# Patient Record
Sex: FEMALE | URBAN - METROPOLITAN AREA
[De-identification: names, ages, dates, MRNs, and addresses within clinical notes are randomized per-mention and may not be internally consistent; named-entity substitution may affect disease eponyms.]

---

## 2024-02-28 PROCEDURE — 88312 SPECIAL STAINS GROUP 1: CPT

## 2024-02-28 PROCEDURE — 88312 SPECIAL STAINS GROUP 1: CPT | Performed by: DERMATOLOGY

## 2024-02-28 PROCEDURE — 88304 TISSUE EXAM BY PATHOLOGIST: CPT

## 2024-02-28 PROCEDURE — 88304 TISSUE EXAM BY PATHOLOGIST: CPT | Performed by: DERMATOLOGY

## 2024-03-04 ENCOUNTER — LAB REQUISITION (OUTPATIENT)
Dept: DERMATOPATHOLOGY | Facility: CLINIC | Age: 44
End: 2024-03-04
Payer: COMMERCIAL

## 2024-03-04 DIAGNOSIS — L98.9 DISORDER OF THE SKIN AND SUBCUTANEOUS TISSUE, UNSPECIFIED: ICD-10-CM

## 2024-03-05 LAB
LAB AP ASR DISCLAIMER: NORMAL
LABORATORY COMMENT REPORT: NORMAL
PATH REPORT.FINAL DX SPEC: NORMAL
PATH REPORT.GROSS SPEC: NORMAL
PATH REPORT.RELEVANT HX SPEC: NORMAL
PATH REPORT.TOTAL CANCER: NORMAL

## 2024-10-16 ENCOUNTER — APPOINTMENT (OUTPATIENT)
Dept: OBSTETRICS AND GYNECOLOGY | Facility: CLINIC | Age: 44
End: 2024-10-16
Payer: COMMERCIAL

## 2024-10-16 VITALS
HEIGHT: 69 IN | SYSTOLIC BLOOD PRESSURE: 122 MMHG | DIASTOLIC BLOOD PRESSURE: 86 MMHG | WEIGHT: 293 LBS | BODY MASS INDEX: 43.4 KG/M2

## 2024-10-16 DIAGNOSIS — Z01.419 WELL WOMAN EXAM WITH ROUTINE GYNECOLOGICAL EXAM: ICD-10-CM

## 2024-10-16 PROCEDURE — 88175 CYTOPATH C/V AUTO FLUID REDO: CPT

## 2024-10-16 PROCEDURE — 99396 PREV VISIT EST AGE 40-64: CPT | Performed by: OBSTETRICS & GYNECOLOGY

## 2024-10-16 PROCEDURE — 3008F BODY MASS INDEX DOCD: CPT | Performed by: OBSTETRICS & GYNECOLOGY

## 2024-10-16 PROCEDURE — 1036F TOBACCO NON-USER: CPT | Performed by: OBSTETRICS & GYNECOLOGY

## 2024-10-16 ASSESSMENT — PAIN SCALES - GENERAL: PAINLEVEL_OUTOF10: 0-NO PAIN

## 2024-10-16 NOTE — PROGRESS NOTES
"Subjective   Patient ID: Kristi Steward \"Tay" is a 44 y.o. female who presents for Well Women Visit (Annual exam with pap smear /- has order for mammogram//Refill birth control//C/o  trouble sleeping, weight gain, anxiety/- discuss perimenopause//Chaperone declined).  HPI  As contained in the chief complaint  Review of Systems  10 systems have been reviewed and are negative and noncontributory to the patient current ailments.    Objective   Physical Exam  Vital signs reviewed    CONSTITUTIONAL- well nourished, well developed, looks like stated age.  She is sitting comfortably on the exam table in no apparent distress  SKIN- normal skin color and pigmentation no visible lesions  EYES- normal external exam  THYROID- symmetrical ,normal size and normal consistency  HEART- RRR without murmur, S3 or S4.  LUNGS- breathing comfortably, no dyspnea  EXTREMITIES- no deformities.  Edema, discoloration, or pain in BLE  NEUROLOGICAL- A/Ox3, conversational   PSYCHIATRIC- alert, pleasant and cordial, age-appropriate, not anxious, or depressed appearing  BREASTS-normal appearance, no skin changes or nipple discharge.  Palpation of the breast and axillae: No palpable mass and no axillary lymphadenopathy  ABDOMEN- soft, non distended, bowel sound normal pitch and intensity,no palpable abnormal masses  GENITOURINARY- External genitalia: Normal.                                 - Uterus: AV/AF NSSC, mobile and nontender                                -The adnexal areas are free of tenderness or mass                                -There are no cervical lesions; there is no cervical motion tenderness                                -Vagina without lesions, mucosa pink and well-hydrated, discharge is physiologic.                                   Inspection of Perianal Area: Normal    Assessment/Plan   Diagnoses and all orders for this visit:  Well woman exam with routine gynecological exam  -Educated on the normal process of " perimenopause and menopause.  Given that she is on a combination birth control pill her symptoms are probably unrelated to perimenopause.  She will address her symptoms with her primary physician         Mo Gama DO 10/16/24 10:19 AM

## 2024-10-22 ENCOUNTER — HOSPITAL ENCOUNTER (OUTPATIENT)
Dept: RADIOLOGY | Facility: CLINIC | Age: 44
Discharge: HOME | End: 2024-10-22
Payer: COMMERCIAL

## 2024-10-22 VITALS — WEIGHT: 293 LBS | HEIGHT: 69 IN | BODY MASS INDEX: 43.4 KG/M2

## 2024-10-22 DIAGNOSIS — Z12.31 BREAST CANCER SCREENING BY MAMMOGRAM: ICD-10-CM

## 2024-10-22 PROCEDURE — 77067 SCR MAMMO BI INCL CAD: CPT

## 2024-10-28 LAB
CYTOLOGY CMNT CVX/VAG CYTO-IMP: NORMAL
LAB AP CONTRACEPTIVE HISTORY: NORMAL
LAB AP HPV GENOTYPE QUESTION: YES
LAB AP HPV HR: NORMAL
LABORATORY COMMENT REPORT: NORMAL
MENSTRUAL HX REPORTED: NORMAL
PATH REPORT.TOTAL CANCER: NORMAL

## 2024-12-09 ENCOUNTER — APPOINTMENT (OUTPATIENT)
Dept: PRIMARY CARE | Facility: CLINIC | Age: 44
End: 2024-12-09
Payer: COMMERCIAL

## 2024-12-09 VITALS
WEIGHT: 293 LBS | TEMPERATURE: 97.4 F | SYSTOLIC BLOOD PRESSURE: 130 MMHG | HEART RATE: 78 BPM | OXYGEN SATURATION: 96 % | BODY MASS INDEX: 43.4 KG/M2 | DIASTOLIC BLOOD PRESSURE: 74 MMHG | HEIGHT: 69 IN

## 2024-12-09 DIAGNOSIS — Z00.00 ROUTINE GENERAL MEDICAL EXAMINATION AT A HEALTH CARE FACILITY: Primary | ICD-10-CM

## 2024-12-09 DIAGNOSIS — M25.561 CHRONIC PAIN OF RIGHT KNEE: ICD-10-CM

## 2024-12-09 DIAGNOSIS — E66.01 CLASS 3 SEVERE OBESITY DUE TO EXCESS CALORIES WITHOUT SERIOUS COMORBIDITY WITH BODY MASS INDEX (BMI) OF 40.0 TO 44.9 IN ADULT: ICD-10-CM

## 2024-12-09 DIAGNOSIS — Z23 ENCOUNTER FOR IMMUNIZATION: ICD-10-CM

## 2024-12-09 DIAGNOSIS — E66.813 CLASS 3 SEVERE OBESITY DUE TO EXCESS CALORIES WITHOUT SERIOUS COMORBIDITY WITH BODY MASS INDEX (BMI) OF 40.0 TO 44.9 IN ADULT: ICD-10-CM

## 2024-12-09 DIAGNOSIS — G89.29 CHRONIC PAIN OF RIGHT KNEE: ICD-10-CM

## 2024-12-09 PROBLEM — G43.829 MENSTRUAL MIGRAINE: Status: ACTIVE | Noted: 2024-12-09

## 2024-12-09 LAB
ALBUMIN SERPL BCP-MCNC: 3.9 G/DL (ref 3.4–5)
ALP SERPL-CCNC: 43 U/L (ref 33–110)
ALT SERPL W P-5'-P-CCNC: 4 U/L (ref 7–45)
ANION GAP SERPL CALC-SCNC: 14 MMOL/L (ref 10–20)
AST SERPL W P-5'-P-CCNC: 11 U/L (ref 9–39)
BILIRUB SERPL-MCNC: 0.5 MG/DL (ref 0–1.2)
BUN SERPL-MCNC: 14 MG/DL (ref 6–23)
CALCIUM SERPL-MCNC: 8.9 MG/DL (ref 8.6–10.6)
CHLORIDE SERPL-SCNC: 106 MMOL/L (ref 98–107)
CHOLEST SERPL-MCNC: 213 MG/DL (ref 0–199)
CHOLESTEROL/HDL RATIO: 3.7
CO2 SERPL-SCNC: 24 MMOL/L (ref 21–32)
CREAT SERPL-MCNC: 0.91 MG/DL (ref 0.5–1.05)
EGFRCR SERPLBLD CKD-EPI 2021: 80 ML/MIN/1.73M*2
ERYTHROCYTE [DISTWIDTH] IN BLOOD BY AUTOMATED COUNT: 13.1 % (ref 11.5–14.5)
EST. AVERAGE GLUCOSE BLD GHB EST-MCNC: 100 MG/DL
GLUCOSE SERPL-MCNC: 93 MG/DL (ref 74–99)
HBA1C MFR BLD: 5.1 %
HCT VFR BLD AUTO: 44.1 % (ref 36–46)
HDLC SERPL-MCNC: 57.1 MG/DL
HGB BLD-MCNC: 14.2 G/DL (ref 12–16)
HIV 1+2 AB+HIV1 P24 AG SERPL QL IA: NONREACTIVE
LDLC SERPL CALC-MCNC: 128 MG/DL
MCH RBC QN AUTO: 28.6 PG (ref 26–34)
MCHC RBC AUTO-ENTMCNC: 32.2 G/DL (ref 32–36)
MCV RBC AUTO: 89 FL (ref 80–100)
NON HDL CHOLESTEROL: 156 MG/DL (ref 0–149)
NRBC BLD-RTO: 0 /100 WBCS (ref 0–0)
PLATELET # BLD AUTO: 406 X10*3/UL (ref 150–450)
POTASSIUM SERPL-SCNC: 4.1 MMOL/L (ref 3.5–5.3)
PROT SERPL-MCNC: 6.7 G/DL (ref 6.4–8.2)
RBC # BLD AUTO: 4.96 X10*6/UL (ref 4–5.2)
SODIUM SERPL-SCNC: 140 MMOL/L (ref 136–145)
TRIGL SERPL-MCNC: 138 MG/DL (ref 0–149)
TSH SERPL-ACNC: 2.13 MIU/L (ref 0.44–3.98)
VLDL: 28 MG/DL (ref 0–40)
WBC # BLD AUTO: 6 X10*3/UL (ref 4.4–11.3)

## 2024-12-09 PROCEDURE — 85027 COMPLETE CBC AUTOMATED: CPT

## 2024-12-09 PROCEDURE — 84443 ASSAY THYROID STIM HORMONE: CPT

## 2024-12-09 PROCEDURE — 87389 HIV-1 AG W/HIV-1&-2 AB AG IA: CPT

## 2024-12-09 PROCEDURE — 90480 ADMN SARSCOV2 VAC 1/ONLY CMP: CPT | Performed by: NURSE PRACTITIONER

## 2024-12-09 PROCEDURE — 91320 SARSCV2 VAC 30MCG TRS-SUC IM: CPT | Performed by: NURSE PRACTITIONER

## 2024-12-09 PROCEDURE — 99386 PREV VISIT NEW AGE 40-64: CPT | Performed by: NURSE PRACTITIONER

## 2024-12-09 PROCEDURE — 90471 IMMUNIZATION ADMIN: CPT | Performed by: NURSE PRACTITIONER

## 2024-12-09 PROCEDURE — 3008F BODY MASS INDEX DOCD: CPT | Performed by: NURSE PRACTITIONER

## 2024-12-09 PROCEDURE — 83036 HEMOGLOBIN GLYCOSYLATED A1C: CPT

## 2024-12-09 PROCEDURE — 80053 COMPREHEN METABOLIC PANEL: CPT

## 2024-12-09 PROCEDURE — 80061 LIPID PANEL: CPT

## 2024-12-09 PROCEDURE — 90715 TDAP VACCINE 7 YRS/> IM: CPT | Performed by: NURSE PRACTITIONER

## 2024-12-09 ASSESSMENT — ENCOUNTER SYMPTOMS
NERVOUS/ANXIOUS: 0
ARTHRALGIAS: 0
DYSURIA: 0
FATIGUE: 0
MYALGIAS: 0
ABDOMINAL PAIN: 0
VOMITING: 0
BLOOD IN STOOL: 0
BACK PAIN: 0
FREQUENCY: 1
UNEXPECTED WEIGHT CHANGE: 0
SHORTNESS OF BREATH: 0
CONSTIPATION: 0
DIARRHEA: 0
COUGH: 0
APPETITE CHANGE: 0
HEADACHES: 0
PALPITATIONS: 0
NAUSEA: 0

## 2024-12-09 ASSESSMENT — PAIN SCALES - GENERAL: PAINLEVEL_OUTOF10: 0-NO PAIN

## 2024-12-09 NOTE — PATIENT INSTRUCTIONS
It is important that you have yearly check-ups with your healthcare provider to ensure that you stay up to date on your health, screenings, and vaccinations, even if you feel healthy.     Make sure you eat a diet rich in fruits, vegetables, nuts, beans, whole grains, lean meat, eggs, calcium, and good fats (i.e. olive oil, avocado baked fish). AVOID a diet that is high in red meat, trans- and saturated fats, sweetened beverages, and refined grains (i.e. white bread, rice, sweetened cereals).     Stay hydrated with at least 64 ounces of water daily.    Exercise most days per week, for at least 30-45 minutes per session.     Be sure to wear sunscreen of SPF of 15 or higher if you are going to be outside.    Stress management and coping skills are important to manage our stress levels. Some tips include keep in mind that stress isn't a bad thing, talk about your problems, prioritize your responsibilities, focus on the basics, don't put all your eggs in one basket, set aside time for yourself, and keep things in perspective.     Stay up to date with annual eye exams and twice yearly dental exams.    Recommend annual flu vaccination, in addition to age appropriate recommended vaccines.    PAP smear is recommended every 2-3 years for women 21-29 and every 3-5 years for women 30-65.  Annual mammogram is recommended for women 40-75  UTD with Gynecology    Colon cancer screening based on age, family history, and other risk factors. To be ordered at age 45.    Fasting blood work today.  Tdap and COVID booster updated today    Continue with Chiropractor for back pain.  Discussed following up with knee when pain becomes more restricting.  Follow up for annual well check in 1 year.

## 2024-12-09 NOTE — PROGRESS NOTES
"Subjective   Kristi Steward \"Rayne\" is a 44 y.o. female and is here for a comprehensive physical exam. The patient reports no problems.        History:  LMP: Patient's last menstrual period was 2024 (approximate). On OCPS - hormone headaches when regular  10/22 off cycles  12/6 on cycle with OCPs  Never had cycle started early on OCPs in the past        Menopause at NA years   Last pap date: 10/2024 with Gynecology  Abnormal pap? no    Concerns about perimenopause: difficulty sleeping, weight gain - recently increased exercise. 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  5000 steps/d    Stress urinary incontinence with coughing or sneezing  Urinary frequency/urgency  Does not drink a lot of water  +caffeine throughout the day - diet coke or coke 1-2 cans  Protein shakes in AM    Mammogram: 10/2024  '22 cyst  '23 just cysts    Colonoscopy: NA    Eye and dental UTD  Vision good    Hep b series  completed    Mom bladder issues (jovanna 51)  NENO    A lot of anxiety in family - sister with severe MARILYN    Depressed all the time - has therapist PRN  Medication years ago - last taken 10 years ago  On celexa in the past, was helpful  Ok now  Stress reduction: knitting  Walking, steps/daily  Talk to , some friends  +familial stress, holidays    Clinical therapist    Menstrual migraines   Start in left ear and spread  Lasting 24-72 hours  Began in  with aura  Vision crescent moon vision change and vomiting  Coke and aleve help with symptoms  Occur now 6 weeks or less - cycle related    Under care of chiropractor For back pain    Sick end of August with vomiting??    Vertigo in the past related to dehydration  Felt things moving, episode short lived    Eating better now  B: pro shake  L: soup or similar or PB and crackers  D: Hello Frash - typically biggest meal    Cheezits - snack occasional    Diet changed about 6 weeks " ago  Has dropped some weight and has more energy  Has been dieting since age 9  ? Eating d/o  Has been much better - bulimia tendencies    Age 9-10 high cholesterol?    Do you have pain that bothers you in your daily life? no    Review of Systems   Constitutional:  Negative for appetite change, fatigue and unexpected weight change.   HENT:  Negative for congestion, ear pain and hearing loss.    Eyes:  Negative for visual disturbance.   Respiratory:  Negative for cough and shortness of breath.    Cardiovascular:  Negative for chest pain, palpitations and leg swelling.   Gastrointestinal:  Negative for abdominal pain, blood in stool, constipation, diarrhea, nausea and vomiting.   Genitourinary:  Positive for frequency and urgency. Negative for dysuria.   Musculoskeletal:  Negative for arthralgias, back pain and myalgias.   Skin:  Negative for rash.   Neurological:  Negative for syncope and headaches.   Psychiatric/Behavioral:  The patient is not nervous/anxious.         Objective   Physical Exam  Vitals and nursing note reviewed.   Constitutional:       General: She is not in acute distress.     Appearance: Normal appearance. She is obese. She is not toxic-appearing.   HENT:      Head: Normocephalic.      Right Ear: Tympanic membrane, ear canal and external ear normal.      Left Ear: Tympanic membrane, ear canal and external ear normal.      Nose: Nose normal.      Mouth/Throat:      Mouth: Mucous membranes are moist.      Pharynx: Oropharynx is clear.   Eyes:      Conjunctiva/sclera: Conjunctivae normal.   Neck:      Thyroid: No thyromegaly.   Cardiovascular:      Rate and Rhythm: Normal rate and regular rhythm.      Pulses: Normal pulses.      Heart sounds: Normal heart sounds. No murmur heard.  Pulmonary:      Effort: Pulmonary effort is normal. No respiratory distress.      Breath sounds: Normal breath sounds.   Abdominal:      General: Bowel sounds are normal.      Palpations: Abdomen is soft.      Tenderness:  There is no abdominal tenderness.   Musculoskeletal:         General: Normal range of motion.      Cervical back: Neck supple.      Right lower leg: No edema.      Left lower leg: No edema.   Lymphadenopathy:      Cervical: No cervical adenopathy.   Skin:     General: Skin is warm and dry.      Capillary Refill: Capillary refill takes less than 2 seconds.      Findings: No rash.   Neurological:      General: No focal deficit present.      Gait: Gait normal.   Psychiatric:         Mood and Affect: Mood is anxious.         Judgment: Judgment normal.         Assessment/Plan   Healthy female exam.     2. Patient Counseling:  --Nutrition: Stressed importance of moderation in sodium/caffeine intake, saturated fat and cholesterol, caloric balance, sufficient intake of fresh fruits, vegetables, fiber, calcium, iron, and 1 mg of folate supplement per day (for females capable of pregnancy).  --Discussed the issue of estrogen replacement, calcium supplement, and the daily use of baby aspirin.  --Exercise: Stressed the importance of regular exercise.   --Substance Abuse: Discussed cessation/primary prevention of tobacco, alcohol, or other drug use; driving or other dangerous activities under the influence; availability of treatment for abuse.    --Sexuality: Discussed sexually transmitted diseases, partner selection, use of condoms, avoidance of unintended pregnancy  and contraceptive alternatives.   --Injury prevention: Discussed safety belts, safety helmets, smoke detector, smoking near bedding or upholstery.   --Dental health: Discussed importance of regular tooth brushing, flossing, and dental visits.  --Immunizations reviewed.  --Discussed benefits of screening colonoscopy.  --After hours service discussed with patient  3. Discussed the patient's BMI with her.  The BMI is above average. The patient received Provided instructions on dietary changes  Provided instructions on exercise  Advised to Increase physical activity  because they have an above normal BMI.  4. Follow up next physical in 1 year    Patient Instructions   It is important that you have yearly check-ups with your healthcare provider to ensure that you stay up to date on your health, screenings, and vaccinations, even if you feel healthy.     Make sure you eat a diet rich in fruits, vegetables, nuts, beans, whole grains, lean meat, eggs, calcium, and good fats (i.e. olive oil, avocado baked fish). AVOID a diet that is high in red meat, trans- and saturated fats, sweetened beverages, and refined grains (i.e. white bread, rice, sweetened cereals).     Stay hydrated with at least 64 ounces of water daily.    Exercise most days per week, for at least 30-45 minutes per session.     Be sure to wear sunscreen of SPF of 15 or higher if you are going to be outside.    Stress management and coping skills are important to manage our stress levels. Some tips include keep in mind that stress isn't a bad thing, talk about your problems, prioritize your responsibilities, focus on the basics, don't put all your eggs in one basket, set aside time for yourself, and keep things in perspective.     Stay up to date with annual eye exams and twice yearly dental exams.    Recommend annual flu vaccination, in addition to age appropriate recommended vaccines.    PAP smear is recommended every 2-3 years for women 21-29 and every 3-5 years for women 30-65.  Annual mammogram is recommended for women 40-75  UTD with Gynecology    Colon cancer screening based on age, family history, and other risk factors. To be ordered at age 45.    Fasting blood work today.  Tdap and COVID booster updated today    Continue with Chiropractor for back pain.  Discussed following up with knee when pain becomes more restricting.  Follow up for annual well check in 1 year.

## 2025-01-04 DIAGNOSIS — G43.829 MENSTRUAL MIGRAINE, NOT INTRACTABLE, WITHOUT STATUS MIGRAINOSUS: ICD-10-CM

## 2025-01-06 RX ORDER — NORGESTIMATE AND ETHINYL ESTRADIOL 0.25-0.035
KIT ORAL
Qty: 112 TABLET | Refills: 4 | Status: SHIPPED | OUTPATIENT
Start: 2025-01-06

## 2025-02-18 ENCOUNTER — ANCILLARY PROCEDURE (OUTPATIENT)
Dept: URGENT CARE | Age: 45
End: 2025-02-18
Payer: COMMERCIAL

## 2025-02-18 ENCOUNTER — OFFICE VISIT (OUTPATIENT)
Dept: URGENT CARE | Age: 45
End: 2025-02-18
Payer: COMMERCIAL

## 2025-02-18 VITALS
HEIGHT: 69 IN | BODY MASS INDEX: 43.4 KG/M2 | WEIGHT: 293 LBS | OXYGEN SATURATION: 99 % | RESPIRATION RATE: 17 BRPM | HEART RATE: 89 BPM | DIASTOLIC BLOOD PRESSURE: 92 MMHG | SYSTOLIC BLOOD PRESSURE: 135 MMHG | TEMPERATURE: 98.3 F
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
## 2025-02-18 DIAGNOSIS — M25.562 ACUTE PAIN OF LEFT KNEE: ICD-10-CM

## 2025-02-18 DIAGNOSIS — M25.562 ACUTE PAIN OF LEFT KNEE: Primary | ICD-10-CM

## 2025-02-18 PROCEDURE — 99213 OFFICE O/P EST LOW 20 MIN: CPT | Performed by: STUDENT IN AN ORGANIZED HEALTH CARE EDUCATION/TRAINING PROGRAM

## 2025-02-18 PROCEDURE — 73562 X-RAY EXAM OF KNEE 3: CPT | Mod: LEFT SIDE | Performed by: STUDENT IN AN ORGANIZED HEALTH CARE EDUCATION/TRAINING PROGRAM

## 2025-02-18 PROCEDURE — 1036F TOBACCO NON-USER: CPT | Performed by: STUDENT IN AN ORGANIZED HEALTH CARE EDUCATION/TRAINING PROGRAM

## 2025-02-18 PROCEDURE — 3008F BODY MASS INDEX DOCD: CPT | Performed by: STUDENT IN AN ORGANIZED HEALTH CARE EDUCATION/TRAINING PROGRAM

## 2025-02-18 ASSESSMENT — ENCOUNTER SYMPTOMS
INABILITY TO BEAR WEIGHT: 1
ARTHRALGIAS: 1
LOSS OF SENSATION: 0
MUSCLE WEAKNESS: 0
LOSS OF MOTION: 1
TINGLING: 0

## 2025-02-18 ASSESSMENT — PATIENT HEALTH QUESTIONNAIRE - PHQ9
1. LITTLE INTEREST OR PLEASURE IN DOING THINGS: NOT AT ALL
2. FEELING DOWN, DEPRESSED OR HOPELESS: NOT AT ALL
SUM OF ALL RESPONSES TO PHQ9 QUESTIONS 1 AND 2: 0

## 2025-02-18 NOTE — PROGRESS NOTES
"Subjective   Patient ID: Kristi Steward \"Tay" is a 44 y.o. female. They present today with a chief complaint of Injury (Slipped on ice and injured left knee yesterday ).    History of Present Illness    Injury  Lower Extremity Issue   The incident occurred 12 to 24 hours ago. The incident occurred in the street. The injury mechanism was a fall. The pain is present in the left knee. The quality of the pain is described as aching and shooting. The pain is at a severity of 7/10. The pain has been Intermittent since onset. Associated symptoms include an inability to bear weight and a loss of motion. Pertinent negatives include no loss of sensation, muscle weakness or tingling. She reports no foreign bodies present. The symptoms are aggravated by movement.       Patient presents to the urgent care for a chief complaint of left knee pain after slipping and falling on ice yesterday approximately 9:45 PM, patient concerned as she felt a pop.  Denies hitting her head or losing consciousness.  No previous history of knee injury such as fracture or dislocation no known osteoarthritis.  Has taken ibuprofen and iced.  Is able to ambulate and bear weight able to flex and extend  Past Medical History  Allergies as of 02/18/2025    (No Known Allergies)       (Not in a hospital admission)       Past Medical History:   Diagnosis Date    Abnormal Pap smear of cervix 10/2013    Depression 3/2009    Migraine 1/2007       Past Surgical History:   Procedure Laterality Date    CERVICAL BIOPSY  W/ LOOP ELECTRODE EXCISION  2013    OTHER SURGICAL HISTORY  09/30/2022    [place] tooth extraction    WISDOM TOOTH EXTRACTION  12/2001        reports that she has never smoked. She has never been exposed to tobacco smoke. She has never used smokeless tobacco. She reports current alcohol use of about 1.0 standard drink of alcohol per week. She reports that she does not use drugs.    Review of Systems  Review of Systems   Musculoskeletal:  Positive " "for arthralgias.   Neurological:  Negative for tingling.   All other systems reviewed and are negative.                                 Objective    Vitals:    02/18/25 0810   BP: (!) 135/92   BP Location: Left arm   Patient Position: Sitting   BP Cuff Size: Adult   Pulse: 89   Resp: 17   Temp: 36.8 °C (98.3 °F)   TempSrc: Oral   SpO2: 99%   Weight: 141 kg (310 lb)   Height: 1.753 m (5' 9\")     No LMP recorded.    Physical Exam  Vitals and nursing note reviewed.   Constitutional:       General: She is not in acute distress.     Appearance: Normal appearance. She is not ill-appearing, toxic-appearing or diaphoretic.   Cardiovascular:      Rate and Rhythm: Normal rate.   Pulmonary:      Effort: Pulmonary effort is normal. No respiratory distress.      Breath sounds: Normal breath sounds.   Musculoskeletal:         General: Swelling and tenderness present.      Comments: Upon examination of the left knee there is the presence of minimal edema no ecchymosis erythema or ecchymosis.  Patient is able to ambulate and bear weight popliteal pulses palpable.  Tender to palpation medial joint line, does endorse pain medial joint line with varus and valgus stressing, positive Apley's grind negative McMurphy's negative anterior draw negative posterior sag   Skin:     Findings: No bruising, erythema, lesion or rash.   Neurological:      General: No focal deficit present.      Mental Status: She is alert and oriented to person, place, and time.   Psychiatric:         Mood and Affect: Mood normal.         Behavior: Behavior normal.         Procedures    Point of Care Test & Imaging Results from this visit  -Will obtain x-ray of left knee to rule out acute osseous injury  -Upon examination of left knee radiographs I do not appreciate any acute osseous abnormalities at time of dictation awaiting radiology interpretation  Diagnostic study results (if any) were reviewed by Mathieu Priest PA-C.    Assessment/Plan   Allergies, " medications, history, and pertinent labs/EKGs/Imaging reviewed by Mathieu Priest PA-C.     Medical Decision Making  I did discuss with patient the use of ice rest and elevation, may take Tylenol Motrin if no resolution or regression of symptoms in 5 to 7 days patient is to follow-up with orthopedics information provided patient verbalized understanding is agreeable to plan discharge emergent care A+O x 4 stable condition no signs of distress able to ambulate and bear weight neurovascular intact    Orders and Diagnoses  Diagnoses and all orders for this visit:  Acute pain of left knee  -     XR knee left 3 views; Future  Fall, initial encounter  -     XR knee left 3 views; Future      Medical Admin Record      Patient disposition: Home    Electronically signed by Mathieu Priest PA-C  8:55 AM

## 2025-02-25 ENCOUNTER — APPOINTMENT (OUTPATIENT)
Dept: ORTHOPEDIC SURGERY | Facility: CLINIC | Age: 45
End: 2025-02-25
Payer: COMMERCIAL

## 2025-02-25 VITALS — WEIGHT: 293 LBS | BODY MASS INDEX: 43.4 KG/M2 | HEIGHT: 69 IN

## 2025-02-25 DIAGNOSIS — S83.412A SPRAIN OF MEDIAL COLLATERAL LIGAMENT OF LEFT KNEE, INITIAL ENCOUNTER: Primary | ICD-10-CM

## 2025-02-25 PROCEDURE — 1036F TOBACCO NON-USER: CPT | Performed by: FAMILY MEDICINE

## 2025-02-25 PROCEDURE — 3008F BODY MASS INDEX DOCD: CPT | Performed by: FAMILY MEDICINE

## 2025-02-25 PROCEDURE — 99243 OFF/OP CNSLTJ NEW/EST LOW 30: CPT | Performed by: FAMILY MEDICINE

## 2025-02-25 NOTE — LETTER
February 25, 2025     Mathieu Priest PA-C  [address]  [place]  [facility] [address]    Patient: Rayne Steward   YOB: 1980   Date of Visit: 2/25/2025       Dear Dr. Mathieu Priest PA-C:    Thank you for referring Rayne Steward to me for evaluation. Below are my notes for this consultation.  If you have questions, please do not hesitate to call me. I look forward to following your patient along with you.       Sincerely,     Stalin Grey MD      CC: No Recipients  ______________________________________________________________________________________      History of Present Illness   Chief Complaint   Patient presents with   • Left Knee - Injury     Pt slipped on ice and felt a pop 2/17/25  +pain, +sleep -swelling       The patient is 44 y.o. female  here with a complaint of left knee injury.  Patient was referred by urgent care provider, Mathieu Priest.  Onset of symptoms approximately 1 week ago after slipping on ice, twisting her knee in the process with acute onset of pain.  Patient was seen in urgent care following injury, she had x-rays obtained of the knee that showed some mild medial joint space narrowing but no acute abnormality, at that time she was having 7 out of 10 pain in the medial aspect of her knee.  Orthopedic follow-up was recommended by urgent care provider, she has been taking Tylenol for pain control.  She does admit to improvement in symptoms over the past week, still having pain over the medial knee, certain twisting motions will exacerbate her pain.  She denies any swelling now or at time of injury, no locking or catching or instability.  She denies any history of any knee problems in the past.    Past Medical History:   Diagnosis Date   • Abnormal Pap smear of cervix 10/2013   • Depression 3/2009   • Migraine 1/2007       Medication Documentation Review Audit       Reviewed by Mathieu Priest PA-C (Physician Assistant) on 02/18/25 at 0813       Medication Order Taking? Sig Documenting Provider Last Dose Status   norgestimate-ethinyl estradiol (Angela) 0.25-35 mg-mcg tablet [id number]  TAKE 1 TABLET BY MOUTH EVERY DAY SKIP [place]ACEBO TABLETS AND OPEN NEW PACK Mo Gama, DO  Active                    No Known Allergies    Social History     Socioeconomic History   • Marital status:      Spouse name: Julio   • Number of children: 0   • Years of education: Not on file   • Highest education level: Not on file   Occupational History   • Not on file   Tobacco Use   • Smoking status: Never     Passive exposure: Never   • Smokeless tobacco: Never   • Tobacco comments:     None   Vaping Use   • Vaping status: Never Used   Substance and Sexual Activity   • Alcohol use: Yes     Alcohol/week: 1.0 standard drink of alcohol     Types: 1 Cans of beer per week     Comment: I dont drink alcohol frequently but am a social drinker   • Drug use: Never   • Sexual activity: Yes     Partners: Male     Birth control/protection: OCP   Other Topics Concern   • Not on file   Social History Narrative   • Not on file     Social Drivers of Health     Financial Resource Strain: Not on file   Food Insecurity: Not on file   Transportation Needs: Not on file   Physical Activity: Not on file   Stress: Not on file   Social Connections: Not on file   Intimate Partner Violence: Not on file   Housing Stability: Not on file       Past Surgical History:   Procedure Laterality Date   • CERVICAL BIOPSY  W/ LOOP ELECTRODE EXCISION  2013   • OTHER SURGICAL HISTORY  09/30/2022    [place] tooth extraction   • WISDOM TOOTH EXTRACTION  12/2001          Review of Systems   GENERAL: Negative  GI: Negative  MUSCULOSKELETAL: See HPI  SKIN: Negative  NEURO:  Negative     Physical Exam:    General/Constitutional: well appearing, no distress, appears stated age  HEENT: sclera clear  Respiratory: non labored breathing  Vascular: No edema, swelling or tenderness, except as noted in detailed  exam.  Integumentary: No impressive skin lesions present, except as noted in detailed exam.  Neurological:  Alert and oriented   Psychological:  Normal mood and affect.  Musculoskeletal: Normal, except as noted in detailed exam and in HPI.  Normal gait, unassisted    Left knee: Valgus knee deformity otherwise normal appearance.  no swelling, no redness or warmth, no joint effusion is present.  There is some mild tenderness at the MCL origin at the medial femoral condyle and at the medial joint line.  Nontender at the medial proximal tibia.  No other areas of significant tenderness to palpation.  She has good range of motion at the knee from 0 to 120 degrees of flexion, there is minimal discomfort endrange of flexion.  No motor deficits or pain with strength testing at the knee.  There is mild discomfort with Kam testing, there are some mild pain with valgus stress but no laxity.,  Negative Lachman, negative posterior drawer       Imaging: X-rays of left knee from 2/18/2025 independently reviewed, there is some mild medial joint space narrowing, no acute abnormalities are seen, no joint effusion is appreciated on lateral view.      Assessment   1. Sprain of medial collateral ligament of left knee, initial encounter              Plan: Left knee injury, history, exam findings suggestive of low-grade MCL sprain, may have aggravated some mild underlying osteoarthritis, less concern for medial meniscus tear but does remain in differential.  Recommending conservative management, she has seen good improvement over the past week, she will continue with Tylenol as needed for pain.  She was given a home exercise rehabilitation program to begin.  She will plan for follow-up if symptoms are ongoing despite conservative management over the next 3 to 4 weeks.  She does have a vacation to [place] in June and wants to make sure she is doing well for this.

## 2025-02-25 NOTE — PROGRESS NOTES
History of Present Illness   Chief Complaint   Patient presents with    Left Knee - Injury     Pt slipped on ice and felt a pop 2/17/25  +pain, +sleep -swelling       The patient is 44 y.o. female  here with a complaint of left knee injury.  Patient was referred by urgent care provider, Mathieu Priest.  Onset of symptoms approximately 1 week ago after slipping on ice, twisting her knee in the process with acute onset of pain.  Patient was seen in urgent care following injury, she had x-rays obtained of the knee that showed some mild medial joint space narrowing but no acute abnormality, at that time she was having 7 out of 10 pain in the medial aspect of her knee.  Orthopedic follow-up was recommended by urgent care provider, she has been taking Tylenol for pain control.  She does admit to improvement in symptoms over the past week, still having pain over the medial knee, certain twisting motions will exacerbate her pain.  She denies any swelling now or at time of injury, no locking or catching or instability.  She denies any history of any knee problems in the past.    Past Medical History:   Diagnosis Date    Abnormal Pap smear of cervix 10/2013    Depression 3/2009    Migraine 1/2007       Medication Documentation Review Audit       Reviewed by Mathieu Priest PA-C (Physician Assistant) on 02/18/25 at 0813      Medication Order Taking? Sig Documenting Provider Last Dose Status   norgestimate-ethinyl estradiol (Angela) 0.25-35 mg-mcg tablet [id number]  TAKE 1 TABLET BY MOUTH EVERY DAY SKIP PLACEBO TABLETS AND OPEN NEW PACK Mo Gama, DO  Active                    No Known Allergies    Social History     Socioeconomic History    Marital status:      Spouse name: Julio    Number of children: 0    Years of education: Not on file    Highest education level: Not on file   Occupational History    Not on file   Tobacco Use    Smoking status: Never     Passive exposure: Never    Smokeless tobacco: Never     Tobacco comments:     None   Vaping Use    Vaping status: Never Used   Substance and Sexual Activity    Alcohol use: Yes     Alcohol/week: 1.0 standard drink of alcohol     Types: 1 Cans of beer per week     Comment: I dont drink alcohol frequently but am a social drinker    Drug use: Never    Sexual activity: Yes     Partners: Male     Birth control/protection: OCP   Other Topics Concern    Not on file   Social History Narrative    Not on file     Social Drivers of Health     Financial Resource Strain: Not on file   Food Insecurity: Not on file   Transportation Needs: Not on file   Physical Activity: Not on file   Stress: Not on file   Social Connections: Not on file   Intimate Partner Violence: Not on file   Housing Stability: Not on file       Past Surgical History:   Procedure Laterality Date    CERVICAL BIOPSY  W/ LOOP ELECTRODE EXCISION  2013    OTHER SURGICAL HISTORY  09/30/2022    [place] tooth extraction    WISDOM TOOTH EXTRACTION  12/2001          Review of Systems   GENERAL: Negative  GI: Negative  MUSCULOSKELETAL: See HPI  SKIN: Negative  NEURO:  Negative     Physical Exam:    General/Constitutional: well appearing, no distress, appears stated age  HEENT: sclera clear  Respiratory: non labored breathing  Vascular: No edema, swelling or tenderness, except as noted in detailed exam.  Integumentary: No impressive skin lesions present, except as noted in detailed exam.  Neurological:  Alert and oriented   Psychological:  Normal mood and affect.  Musculoskeletal: Normal, except as noted in detailed exam and in HPI.  Normal gait, unassisted    Left knee: Valgus knee deformity otherwise normal appearance.  no swelling, no redness or warmth, no joint effusion is present.  There is some mild tenderness at the MCL origin at the medial femoral condyle and at the medial joint line.  Nontender at the medial proximal tibia.  No other areas of significant tenderness to palpation.  She has good range of motion at the  knee from 0 to 120 degrees of flexion, there is minimal discomfort endrange of flexion.  No motor deficits or pain with strength testing at the knee.  There is mild discomfort with Kam testing, there are some mild pain with valgus stress but no laxity.,  Negative Lachman, negative posterior drawer       Imaging: X-rays of left knee from 2/18/2025 independently reviewed, there is some mild medial joint space narrowing, no acute abnormalities are seen, no joint effusion is appreciated on lateral view.      Assessment   1. Sprain of medial collateral ligament of left knee, initial encounter              Plan: Left knee injury, history, exam findings suggestive of low-grade MCL sprain, may have aggravated some mild underlying osteoarthritis, less concern for medial meniscus tear but does remain in differential.  Recommending conservative management, she has seen good improvement over the past week, she will continue with Tylenol as needed for pain.  She was given a home exercise rehabilitation program to begin.  She will plan for follow-up if symptoms are ongoing despite conservative management over the next 3 to 4 weeks.  She does have a vacation to [place] in June and wants to make sure she is doing well for this.

## 2025-06-03 ENCOUNTER — OFFICE VISIT (OUTPATIENT)
Dept: PRIMARY CARE | Facility: CLINIC | Age: 45
End: 2025-06-03
Payer: COMMERCIAL

## 2025-06-03 DIAGNOSIS — Z91.09 ENVIRONMENTAL ALLERGIES: Primary | ICD-10-CM

## 2025-06-03 PROCEDURE — 3008F BODY MASS INDEX DOCD: CPT | Performed by: FAMILY MEDICINE

## 2025-06-03 PROCEDURE — 99213 OFFICE O/P EST LOW 20 MIN: CPT | Performed by: FAMILY MEDICINE

## 2025-06-03 ASSESSMENT — PATIENT HEALTH QUESTIONNAIRE - PHQ9
2. FEELING DOWN, DEPRESSED OR HOPELESS: NOT AT ALL
1. LITTLE INTEREST OR PLEASURE IN DOING THINGS: NOT AT ALL
SUM OF ALL RESPONSES TO PHQ9 QUESTIONS 1 AND 2: 0

## 2025-06-03 NOTE — PROGRESS NOTES
"Subjective   Patient ID: Kristi Steward \"Tay" is a 45 y.o. female who presents for Nasal Congestion (x1 month) and Migraine (x2 days).  May 12 started sneezing a lot.  First week was very congested.  Mostly went away, but congestion would come and go.  Also had a migraine, which she usually gets around menses.  On regular birth control.  Having a lot of postnasal drip   No pain in face now, but did over the weekend.  Ears feel like need to pop.  Drainage still clear.  No chest congestion.  Occasional dry cough when she has dry throat.  No fever   Using aleve and acetaminophen.          Review of Systems    Objective   /82   Pulse 64   Temp 36 °C (96.8 °F) (Temporal)   Ht 1.753 m (5' 9\")   Wt 139 kg (307 lb 3.2 oz)   SpO2 100%   BMI 45.37 kg/m²     Physical Exam  Vitals reviewed.   Constitutional:       Appearance: Normal appearance.   HENT:      Right Ear: Tympanic membrane and ear canal normal.      Left Ear: Tympanic membrane and ear canal normal.      Nose: Rhinorrhea present.      Mouth/Throat:      Pharynx: No oropharyngeal exudate or posterior oropharyngeal erythema.   Cardiovascular:      Rate and Rhythm: Normal rate and regular rhythm.      Heart sounds: No murmur heard.  Pulmonary:      Effort: Pulmonary effort is normal.      Breath sounds: Normal breath sounds.   Lymphadenopathy:      Cervical: No cervical adenopathy.   Neurological:      Mental Status: She is alert.           Assessment/Plan   Problem List Items Addressed This Visit    None  Visit Diagnoses         Environmental allergies    -  Primary               "

## 2025-06-04 VITALS
HEIGHT: 69 IN | SYSTOLIC BLOOD PRESSURE: 128 MMHG | DIASTOLIC BLOOD PRESSURE: 82 MMHG | BODY MASS INDEX: 43.4 KG/M2 | HEART RATE: 64 BPM | WEIGHT: 293 LBS | TEMPERATURE: 96.8 F | OXYGEN SATURATION: 100 %

## 2025-06-04 NOTE — PATIENT INSTRUCTIONS
Symptoms most consistent with allergies.  Use Flonase for congestion.  Try allegra or zyrtec for drainage, sneezing.  If continued symptoms, consider steroid pack.  If drainage changes to green, having pain in face, will treat as sinusitis.

## 2025-07-15 ENCOUNTER — APPOINTMENT (OUTPATIENT)
Dept: RADIOLOGY | Facility: HOSPITAL | Age: 45
End: 2025-07-15
Payer: COMMERCIAL

## 2025-07-15 ENCOUNTER — HOSPITAL ENCOUNTER (EMERGENCY)
Facility: HOSPITAL | Age: 45
Discharge: HOME | End: 2025-07-15
Payer: COMMERCIAL

## 2025-07-15 VITALS
HEIGHT: 70 IN | DIASTOLIC BLOOD PRESSURE: 111 MMHG | RESPIRATION RATE: 18 BRPM | HEART RATE: 65 BPM | SYSTOLIC BLOOD PRESSURE: 146 MMHG | TEMPERATURE: 97.2 F | WEIGHT: 293 LBS | OXYGEN SATURATION: 100 % | BODY MASS INDEX: 41.95 KG/M2

## 2025-07-15 DIAGNOSIS — R10.9 FLANK PAIN: ICD-10-CM

## 2025-07-15 DIAGNOSIS — N30.00 ACUTE CYSTITIS WITHOUT HEMATURIA: ICD-10-CM

## 2025-07-15 DIAGNOSIS — G43.109 MIGRAINE WITH AURA AND WITHOUT STATUS MIGRAINOSUS, NOT INTRACTABLE: Primary | ICD-10-CM

## 2025-07-15 LAB
ALBUMIN SERPL BCP-MCNC: 4.1 G/DL (ref 3.4–5)
ALP SERPL-CCNC: 40 U/L (ref 33–110)
ALT SERPL W P-5'-P-CCNC: 5 U/L (ref 7–45)
ANION GAP SERPL CALC-SCNC: 11 MMOL/L (ref 10–20)
APPEARANCE UR: CLEAR
AST SERPL W P-5'-P-CCNC: 10 U/L (ref 9–39)
BACTERIA #/AREA URNS AUTO: ABNORMAL /HPF
BASOPHILS # BLD AUTO: 0.07 X10*3/UL (ref 0–0.1)
BASOPHILS NFR BLD AUTO: 0.8 %
BILIRUB SERPL-MCNC: 0.4 MG/DL (ref 0–1.2)
BILIRUB UR STRIP.AUTO-MCNC: NEGATIVE MG/DL
BUN SERPL-MCNC: 8 MG/DL (ref 6–23)
CALCIUM SERPL-MCNC: 9.1 MG/DL (ref 8.6–10.3)
CHLORIDE SERPL-SCNC: 106 MMOL/L (ref 98–107)
CO2 SERPL-SCNC: 26 MMOL/L (ref 21–32)
COLOR UR: COLORLESS
CREAT SERPL-MCNC: 0.96 MG/DL (ref 0.5–1.05)
EGFRCR SERPLBLD CKD-EPI 2021: 75 ML/MIN/1.73M*2
EOSINOPHIL # BLD AUTO: 0.17 X10*3/UL (ref 0–0.7)
EOSINOPHIL NFR BLD AUTO: 2 %
ERYTHROCYTE [DISTWIDTH] IN BLOOD BY AUTOMATED COUNT: 12.7 % (ref 11.5–14.5)
GLUCOSE SERPL-MCNC: 108 MG/DL (ref 74–99)
GLUCOSE UR STRIP.AUTO-MCNC: NORMAL MG/DL
HCT VFR BLD AUTO: 46.1 % (ref 36–46)
HGB BLD-MCNC: 14.8 G/DL (ref 12–16)
IMM GRANULOCYTES # BLD AUTO: 0.02 X10*3/UL (ref 0–0.7)
IMM GRANULOCYTES NFR BLD AUTO: 0.2 % (ref 0–0.9)
KETONES UR STRIP.AUTO-MCNC: NEGATIVE MG/DL
LEUKOCYTE ESTERASE UR QL STRIP.AUTO: NEGATIVE
LYMPHOCYTES # BLD AUTO: 3.39 X10*3/UL (ref 1.2–4.8)
LYMPHOCYTES NFR BLD AUTO: 39.7 %
MCH RBC QN AUTO: 28.8 PG (ref 26–34)
MCHC RBC AUTO-ENTMCNC: 32.1 G/DL (ref 32–36)
MCV RBC AUTO: 90 FL (ref 80–100)
MONOCYTES # BLD AUTO: 0.48 X10*3/UL (ref 0.1–1)
MONOCYTES NFR BLD AUTO: 5.6 %
NEUTROPHILS # BLD AUTO: 4.4 X10*3/UL (ref 1.2–7.7)
NEUTROPHILS NFR BLD AUTO: 51.7 %
NITRITE UR QL STRIP.AUTO: NEGATIVE
NRBC BLD-RTO: 0 /100 WBCS (ref 0–0)
PH UR STRIP.AUTO: 6.5 [PH]
PLATELET # BLD AUTO: 401 X10*3/UL (ref 150–450)
POTASSIUM SERPL-SCNC: 3.6 MMOL/L (ref 3.5–5.3)
PROT SERPL-MCNC: 7.6 G/DL (ref 6.4–8.2)
PROT UR STRIP.AUTO-MCNC: NEGATIVE MG/DL
RBC # BLD AUTO: 5.14 X10*6/UL (ref 4–5.2)
RBC # UR STRIP.AUTO: ABNORMAL MG/DL
RBC #/AREA URNS AUTO: ABNORMAL /HPF
SODIUM SERPL-SCNC: 139 MMOL/L (ref 136–145)
SP GR UR STRIP.AUTO: 1
SQUAMOUS #/AREA URNS AUTO: ABNORMAL /HPF
UROBILINOGEN UR STRIP.AUTO-MCNC: NORMAL MG/DL
WBC # BLD AUTO: 8.5 X10*3/UL (ref 4.4–11.3)
WBC #/AREA URNS AUTO: ABNORMAL /HPF

## 2025-07-15 PROCEDURE — 70450 CT HEAD/BRAIN W/O DYE: CPT

## 2025-07-15 PROCEDURE — 2500000004 HC RX 250 GENERAL PHARMACY W/ HCPCS (ALT 636 FOR OP/ED): Performed by: NURSE PRACTITIONER

## 2025-07-15 PROCEDURE — 84075 ASSAY ALKALINE PHOSPHATASE: CPT | Performed by: NURSE PRACTITIONER

## 2025-07-15 PROCEDURE — 96361 HYDRATE IV INFUSION ADD-ON: CPT

## 2025-07-15 PROCEDURE — 81001 URINALYSIS AUTO W/SCOPE: CPT | Performed by: NURSE PRACTITIONER

## 2025-07-15 PROCEDURE — 96374 THER/PROPH/DIAG INJ IV PUSH: CPT

## 2025-07-15 PROCEDURE — 96375 TX/PRO/DX INJ NEW DRUG ADDON: CPT

## 2025-07-15 PROCEDURE — 85025 COMPLETE CBC W/AUTO DIFF WBC: CPT | Performed by: NURSE PRACTITIONER

## 2025-07-15 PROCEDURE — 70450 CT HEAD/BRAIN W/O DYE: CPT | Performed by: RADIOLOGY

## 2025-07-15 PROCEDURE — 99284 EMERGENCY DEPT VISIT MOD MDM: CPT | Mod: 25

## 2025-07-15 RX ORDER — KETOROLAC TROMETHAMINE 30 MG/ML
15 INJECTION, SOLUTION INTRAMUSCULAR; INTRAVENOUS ONCE
Status: COMPLETED | OUTPATIENT
Start: 2025-07-15 | End: 2025-07-15

## 2025-07-15 RX ORDER — METOCLOPRAMIDE HYDROCHLORIDE 5 MG/ML
10 INJECTION INTRAMUSCULAR; INTRAVENOUS ONCE
Status: COMPLETED | OUTPATIENT
Start: 2025-07-15 | End: 2025-07-15

## 2025-07-15 RX ORDER — DIPHENHYDRAMINE HYDROCHLORIDE 50 MG/ML
25 INJECTION, SOLUTION INTRAMUSCULAR; INTRAVENOUS ONCE
Status: COMPLETED | OUTPATIENT
Start: 2025-07-15 | End: 2025-07-15

## 2025-07-15 RX ORDER — CEPHALEXIN 500 MG/1
500 CAPSULE ORAL 2 TIMES DAILY
Qty: 14 CAPSULE | Refills: 0 | Status: SHIPPED | OUTPATIENT
Start: 2025-07-15 | End: 2025-07-22

## 2025-07-15 RX ADMIN — KETOROLAC TROMETHAMINE 15 MG: 30 INJECTION, SOLUTION INTRAMUSCULAR at 18:17

## 2025-07-15 RX ADMIN — DIPHENHYDRAMINE HYDROCHLORIDE 25 MG: 50 INJECTION, SOLUTION INTRAMUSCULAR; INTRAVENOUS at 18:17

## 2025-07-15 RX ADMIN — METOCLOPRAMIDE 10 MG: 5 INJECTION, SOLUTION INTRAMUSCULAR; INTRAVENOUS at 18:18

## 2025-07-15 RX ADMIN — SODIUM CHLORIDE 1000 ML: 0.9 INJECTION, SOLUTION INTRAVENOUS at 18:18

## 2025-07-15 ASSESSMENT — PAIN SCALES - GENERAL: PAINLEVEL_OUTOF10: 6

## 2025-07-15 ASSESSMENT — PAIN - FUNCTIONAL ASSESSMENT: PAIN_FUNCTIONAL_ASSESSMENT: 0-10

## 2025-07-15 ASSESSMENT — PAIN DESCRIPTION - LOCATION: LOCATION: HEAD

## 2025-07-15 ASSESSMENT — PAIN DESCRIPTION - PAIN TYPE: TYPE: ACUTE PAIN

## 2025-07-15 NOTE — ED TRIAGE NOTES
This patient was seen in triage.     Vitals are noted.      HPI:  Patient is a 45-year-old female with no significant past medical history presenting to the emergency department with left-sided headache that has been persistent since Sunday, states that she feels like she gets migraines, gets an aura prior to these headaches and they typically start one-sided and radiating to the ear.  Denies any head injury, photophobia, fever, chills, phonophobia.  Has not followed up with anyone for her headaches, states that she typically just takes over-the-counter medications and they go away.  Denies any rashes, lesions.    Focused PE:  Gen: Well-appearing, not in acute distress  Cardiovascular: Regular rate, normal rhythm, no murmur, no gallop  Respiratory: No adventitious lung sounds auscultated.  Abdomen: No reproducible abdominal tenderness upon palpation,  physical exam may be limited by patient positioning sitting up in a chair  Neuro:  Alert and Oriented, speech clear and coherent     Plan:     IV, lab work, imaging     For the remainder of the patient's workup and ED course, please see the main ED provider note.  We discussed need for diagnostic testing including lab studies and imaging.  We also discussed that they may be asked to wait in the waiting room while these test are pending.  They understand that if they choose to leave without having the testing completed or resulted that we cannot rule out acute life-threatening illnesses and the risks involved to lead to worsening condition, permanent disability or even death.

## 2025-07-15 NOTE — ED PROVIDER NOTES
Emergency Department Encounter  [facility] EMERGENCY MEDICINE    Patient: Kristi Steward  MRN: [id number]  : 1980  Date of Evaluation: 7/15/2025  ED Provider: SARAH Boateng      Chief Complaint       Chief Complaint   Patient presents with    Headache     [place]    (Location/Symptom, Timing/Onset, Context/Setting, Quality, Duration, Modifying Factors, Severity) Note limiting factors.   Limitations to History: none  Historian: self  Records reviewed: EMR inpatient and outpatient notes, Care Everywhere      Kristi Steward is a 45 y.o. female who presents to the emergency department complaining of migraine headache that started on , states that she gets headaches similar to this that she believes are migraines for a long time, last 1 being in March, typically self resolved.  Denies any head injury, pain radiates into the left ear, denies any rashes or lesions.  Denies any fevers, chills, neck pain, photophobia, phonophobia.  Has not seen anyone about her headaches.  Also endorsing left flank pain and believes that she might have a kidney infection, denies any urgency, frequency, states that she has been increasing her fluid intake.    ROS:     Review of Systems  14 systems reviewed and otherwise acutely negative except as in the [place].          Past History   Medical History[1]  Surgical History[2]  Social History[3]    Medications/Allergies     Previous Medications    NORGESTIMATE-ETHINYL ESTRADIOL (CONOR) 0.25-35 MG-MCG TABLET    TAKE 1 TABLET BY MOUTH EVERY DAY SKIP PLACEBO TABLETS AND OPEN NEW PACK     Allergies[4]     Physical Exam       ED Triage Vitals [07/15/25 1738]   Temperature Heart Rate Respirations BP   36.2 °C (97.2 °F) 65 18 (!) 146/111      Pulse Ox Temp src Heart Rate Source Patient Position   99 % -- -- --      BP Location FiO2 (%)     -- --         Physical Exam    GENERAL:  The patient appears nourished and normally developed. Vital signs as documented.      HEENT:  Head normocephalic, atraumatic, EOMs intact, PERRLA, Mucous membranes moist. Nares patent without copious rhinorrhea.  No lymphadenopathy.    PULMONARY:  Lungs are clear to auscultation, without any respiratory distress. Able to speak full sentences, no accessory muscle use    CARDIAC:   Normal rate. No murmurs, rubs or gallops    ABDOMEN:  Soft, non distended, non tender, BS positive x 4 quadrants, No rebound or guarding, no peritoneal signs, no CVA tenderness, no masses or organomegaly    MUSCULOSKELETAL:   Able to ambulate, Non edematous, with no obvious deformities. Pulses intact distal    SKIN:   Good color, with no significant rashes.  No pallor.    NEURO:  No obvious neurological deficits, normal sensation and strength bilaterally.  Able to follow commands, NIH 0, CN 2-12 intact.        Diagnostics   Labs:  Labs Reviewed   CBC WITH AUTO DIFFERENTIAL - Abnormal       Result Value    WBC 8.5      nRBC 0.0      RBC 5.14      Hemoglobin 14.8      Hematocrit 46.1 (*)     MCV 90      MCH 28.8      MCHC 32.1      RDW 12.7      Platelets 401      Neutrophils % 51.7      Immature Granulocytes %, Automated 0.2      Lymphocytes % 39.7      Monocytes % 5.6      Eosinophils % 2.0      Basophils % 0.8      Neutrophils Absolute 4.40      Immature Granulocytes Absolute, Automated 0.02      Lymphocytes Absolute 3.39      Monocytes Absolute 0.48      Eosinophils Absolute 0.17      Basophils Absolute 0.07     COMPREHENSIVE METABOLIC PANEL - Abnormal    Glucose 108 (*)     Sodium 139      Potassium 3.6      Chloride 106      Bicarbonate 26      Anion Gap 11      Urea Nitrogen 8      Creatinine 0.96      eGFR 75      Calcium 9.1      Albumin 4.1      Alkaline Phosphatase 40      Total Protein 7.6      AST 10      Bilirubin, Total 0.4      ALT 5 (*)    URINALYSIS WITH REFLEX CULTURE AND MICROSCOPIC - Abnormal    Color, Urine Colorless (*)     Appearance, Urine Clear      Specific Gravity, Urine 1.005      pH, Urine 6.5  "     Protein, Urine NEGATIVE      Glucose, Urine Normal      Blood, Urine 0.03 (TRACE) (*)     Ketones, Urine NEGATIVE      Bilirubin, Urine NEGATIVE      Urobilinogen, Urine Normal      Nitrite, Urine NEGATIVE      Leukocyte Esterase, Urine NEGATIVE     URINALYSIS MICROSCOPIC WITH REFLEX CULTURE - Abnormal    WBC, Urine NONE      RBC, Urine NONE      Squamous Epithelial Cells, Urine 1-9 (SPARSE)      Bacteria, Urine 1+ (*)    URINALYSIS WITH REFLEX CULTURE AND MICROSCOPIC    Narrative:     The following orders were created for panel order Urinalysis with Reflex Culture and Microscopic.  Procedure                               Abnormality         Status                     ---------                               -----------         ------                     Urinalysis with Reflex C...[[id number]]  Abnormal            Final result               Extra Urine Gray Tube[[id number]]                            In process                   Please view results for these tests on the individual orders.   EXTRA URINE GRAY TUBE     Radiographs:  CT head wo IV contrast   Final Result   No significant intracranial abnormality.        Mucosal thickening and fluid or secretions within the paranasal   sinuses. Correlate for sinusitis.        MACRO:   None        Signed by: Edison Ryan 7/15/2025 6:09 PM   Dictation workstation:   [id number]          Assessment   In brief, Kristi Steward is a 45 y.o. female who presented to the emergency department for left flank pain, headache    Plan   IV, lab work, Toradol, Reglan, Benadryl, imaging    Differentials   Migraine headache  Tension headache  Cluster headache  Neuralgia  Acute cystitis  Pyelonephritis  Nephrolithiasis    ED Course     Diagnoses as of 07/15/25 1853   Migraine with aura and without status migrainosus, not intractable   Acute cystitis without hematuria   Flank pain       Visit Vitals  BP (!) 146/111   Pulse 65   Temp 36.2 °C (97.2 °F)   Resp 18   Ht 1.778 m (5' 10\") "   Wt 136 kg (300 lb)   SpO2 100%   BMI 43.05 kg/m²   OB Status Having periods   Smoking Status Never   BSA 2.59 m²       Medications   sodium chloride 0.9 % bolus 1,000 mL (0 mL intravenous Stopped 7/15/25 1851)   diphenhydrAMINE (BENADryl) injection 25 mg (25 mg intravenous Given 7/15/25 1817)   metoclopramide (Reglan) injection 10 mg (10 mg intravenous Given 7/15/25 1818)   ketorolac (Toradol) injection 15 mg (15 mg intravenous Given 7/15/25 1817)       Plan of care discussed, patient is stable appearing, neurologically intact, sent for imaging since she has not ever had any imaging for her headaches, results reviewed and discussed, given Toradol, Reglan, Benadryl and reports complete resolution of her headache.  Does report that she has had auras in the past and feels like this is a migraine but has not been formally diagnosed, was given a referral for neurology.  Does report flank pain, no CVA tenderness, urinalysis with 1+ bacteria, patient feels like she may have a UTI, put on Keflex.  Do not feel this is pyelonephritis or nephrolithiasis, no hematuria.  Patient will be discharged home in stable condition, educated on any worsening signs and symptoms to return to the emergency department      Final Impression      1. Migraine with aura and without status migrainosus, not intractable    2. Acute cystitis without hematuria    3. Flank pain          DISPOSITION  Disposition: Discharge to home  Patient condition is stable    Comment: Please note this report has been produced using speech recognition software and may contain errors related to that system including errors in grammar, punctuation, and spelling, as well as words and phrases that may be inappropriate.  If there are any questions or concerns please feel free to contact the dictating provider for clarification.    VINNY Boateng-CNP         [1]   Past Medical History:  Diagnosis Date    Abnormal Pap smear of cervix 10/2013    Depression 3/2009     Migraine 1/2007   [2]   Past Surgical History:  Procedure Laterality Date    CERVICAL BIOPSY  W/ LOOP ELECTRODE EXCISION  2013    OTHER SURGICAL HISTORY  09/30/2022    [place] tooth extraction    WISDOM TOOTH EXTRACTION  12/2001   [3]   Social History  Socioeconomic History    Marital status:      Spouse name: Julio    Number of children: 0   Tobacco Use    Smoking status: Never     Passive exposure: Never    Smokeless tobacco: Never    Tobacco comments:     None   Vaping Use    Vaping status: Never Used   Substance and Sexual Activity    Alcohol use: Yes     Alcohol/week: 1.0 standard drink of alcohol     Types: 1 Cans of beer per week     Comment: I dont drink alcohol frequently but am a social drinker    Drug use: Never    Sexual activity: Yes     Partners: Male     Birth control/protection: OCP   [4] No Known Allergies       SARAH Boateng  07/15/25 1750

## 2025-07-16 LAB — HOLD SPECIMEN: NORMAL

## 2025-07-31 ENCOUNTER — CLINICAL SUPPORT (OUTPATIENT)
Dept: PRIMARY CARE | Facility: CLINIC | Age: 45
End: 2025-07-31
Payer: COMMERCIAL

## 2025-07-31 DIAGNOSIS — R39.9 UTI SYMPTOMS: Primary | ICD-10-CM

## 2025-07-31 LAB
POC APPEARANCE, URINE: CLEAR
POC BILIRUBIN, URINE: NEGATIVE
POC BLOOD, URINE: ABNORMAL
POC COLOR, URINE: YELLOW
POC GLUCOSE, URINE: NEGATIVE MG/DL
POC KETONES, URINE: NEGATIVE MG/DL
POC LEUKOCYTES, URINE: NEGATIVE
POC NITRITE,URINE: NEGATIVE
POC PH, URINE: 6.5 PH
POC PROTEIN, URINE: NEGATIVE MG/DL
POC SPECIFIC GRAVITY, URINE: 1.02
POC UROBILINOGEN, URINE: 0.2 EU/DL

## 2025-07-31 PROCEDURE — 81003 URINALYSIS AUTO W/O SCOPE: CPT | Performed by: NURSE PRACTITIONER

## 2025-07-31 NOTE — PROGRESS NOTES
Pt here today for nurse visit-UA and urine culture. Urine sample received and automatically tested. Sample also being sent out for culture.    Results:  GLU neg  FLY neg  KET neg  SG 1.020  BLO trace-lysed  pH 6.5  PRO neg  URO 0.2  NIT neg  SUNG neg    Routing to Lety Bowen NP

## 2025-08-02 LAB — BACTERIA UR CULT: NORMAL

## 2026-02-12 ENCOUNTER — APPOINTMENT (OUTPATIENT)
Dept: NEUROLOGY | Facility: CLINIC | Age: 46
End: 2026-02-12
Payer: COMMERCIAL